# Patient Record
Sex: FEMALE | Race: WHITE | Employment: UNEMPLOYED | URBAN - METROPOLITAN AREA
[De-identification: names, ages, dates, MRNs, and addresses within clinical notes are randomized per-mention and may not be internally consistent; named-entity substitution may affect disease eponyms.]

---

## 2017-01-16 ENCOUNTER — PRE VISIT (OUTPATIENT)
Dept: OTOLARYNGOLOGY | Facility: CLINIC | Age: 37
End: 2017-01-16

## 2017-01-16 NOTE — TELEPHONE ENCOUNTER
ENT PRE VISIT NOTE    On the phone call:    Date of Call: January 16, 2017  Date of appointment: February 16, 2017  Reason for Call (Should match scheduling appointment note): Sleep apnea discuss Inspire   Who made the initial call:  (patient, Parent (Name of Parent), referral source) Patient   Who is the Referring Provider? (Name, address, phone number, location of clinic) Self   Where have you been seen for this condition? [facility]  Where and what testing has been done including: Sleep study: 4/3/16  ENT related surgeries in the past: No       Request medical records: Yes   From where: [facility]  What date: 1/17/17  Who did you speak to: Fax   Is the information already in the EMR? No         Have films been pushed to PAC and when? no.     Have slides been sent to pathology and when?       Have outside records been received? Yes

## 2017-02-16 ENCOUNTER — OFFICE VISIT (OUTPATIENT)
Dept: OTOLARYNGOLOGY | Facility: CLINIC | Age: 37
End: 2017-02-16

## 2017-02-16 VITALS — WEIGHT: 190 LBS | HEIGHT: 72 IN | BODY MASS INDEX: 25.73 KG/M2

## 2017-02-16 DIAGNOSIS — G47.33 OSA (OBSTRUCTIVE SLEEP APNEA): Primary | ICD-10-CM

## 2017-02-16 ASSESSMENT — PAIN SCALES - GENERAL: PAINLEVEL: NO PAIN (0)

## 2017-02-16 NOTE — PATIENT INSTRUCTIONS
Dr. Garcia would like you to consult with one of the three providers listed below to discuss an oral appliance.      *Please note, it is your responsibility to check with your insurance about whether or not these providers are within your Dental network*    Micah Zavala DDS  [facility]  [address] [place] [address]  [phone]      Ashley Apodaca DDS   [facility]  [address] [place] [address] [phone]    Roger Martel DDS  [facility]  [facility]  [phone]    Oral Appliance Therapy  An oral appliance is a small acrylic device that fits over the upper and lower teeth  or tongue (similar to an orthodontic retainer or mouth guard). This device slightly  advances the lower jaw or tongue, moving the base of the tongue forward and  opening the airway. This improves breathing and reduces snoring and apnea.  The appliance is fabricated and customized for each patient by a dentist with  advanced training in the treatment of snoring and sleep apnea. The appliances are  comfortable and well tolerated by the patients. They are easy to place and remove,  easy to clean and are convenient for travel.  The American Academy of Sleep Medicine has stated that an oral appliance is indicated as a first treatment of choice for patients with primary snoring, mild obstructive sleep apnea or patients with moderate YEIMY who  prefer the appliance to CPAP and as a second treatment option for patients with severe sleep apnea who  cannot tolerate CPAP.    Therefore, oral appliance therapy is indicated for:    Primary/heavy snoring    Mild or moderate sleep apnea and certain cases of severe YEIMY    Poor tolerance of nasal CPAP    Failure of surgery    Use during travel    In combination with nasal CPAP    There are a number of temporary side effects that may be noticeable during the first few weeks or may require minor adjustment of the  appliance by the dentist. These include:    Tension in the jaw    Sore teeth or gums    Excessive salivation or a dry mouth    Temporary change in the bite (when appliance is removed in the morning)    Noises in the jaw joint (TMJ)  The potential side effects that can be more problematic include:    Jaw muscle or joint pain    Permanent changes in the bite    Slight movement of teeth    Loosening of dental restorations (crowns, bridges, etc.)      From the research evidence and our clinical experience, jaw muscle and joint pain occur in approximately  10% of the patients and the pain will disappear when the patient discontinues use of the appliance. Soreness  or pain is not an issue for 90% of the patients. Changes in the bite can occur for about 30 - 40% of the  patients. Although the changes may be slight it may still be difficult for the patient to close their back teeth  together, which may have an effect on their ability to chew effectively. The slight movement of teeth and  loosening of dental restorations occurs very infrequently (1% of the patients) but is still worth noting.      Return here as needed, call with questions.     Please call/contact with further questions/concerns.     Sincerely,    MELQUIADES Zepeda R.N.    My days of work are  Monday 12:30-4:30 PM, Tuesday 8-4:30, Thursday 8-4:30. primarily in ENT Triage.    [facility]  ENT clinic [phone]   [address] [place] [address]  Option #1 for appointments  Option # 3 for nurse triage   Fax: [phone]

## 2017-02-16 NOTE — MR AVS SNAPSHOT
After Visit Summary   2/16/2017    Shelli Pretty    MRN: [id number]           Patient Information     Date Of Birth          1980        Visit Information        Provider Department      2/16/2017 10:00 AM Kena Garcia MD [facility]        Care Instructions    Dr. Garcia would like you to consult with one of the three providers listed below to discuss an oral appliance.      *Please note, it is your responsibility to check with your insurance about whether or not these providers are within your Dental network*    Micah Zavala DDS  [facility]  [address] [place] [address]  [phone]      Ashley Apodaca DDS   [facility]  [address] [place] [address] [phone]    Roger Martel DDS  [facility]  [facility]  [phone]    Oral Appliance Therapy  An oral appliance is a small acrylic device that fits over the upper and lower teeth  or tongue (similar to an orthodontic retainer or mouth guard). This device slightly  advances the lower jaw or tongue, moving the base of the tongue forward and  opening the airway. This improves breathing and reduces snoring and apnea.  The appliance is fabricated and customized for each patient by a dentist with  advanced training in the treatment of snoring and sleep apnea. The appliances are  comfortable and well tolerated by the patients. They are easy to place and remove,  easy to clean and are convenient for travel.  The American Academy of Sleep Medicine has stated that an oral appliance is indicated as a first treatment of choice for patients with primary snoring, mild obstructive sleep apnea or patients with moderate YEIMY who  prefer the appliance to CPAP and as a second treatment option for patients with severe sleep apnea who  cannot tolerate CPAP.    Therefore, oral appliance therapy is indicated for:    Primary/heavy snoring     Mild or moderate sleep apnea and certain cases of severe YEIMY    Poor tolerance of nasal CPAP    Failure of surgery    Use during travel    In combination with nasal CPAP    There are a number of temporary side effects that may be noticeable during the first few weeks or may require minor adjustment of the appliance by the dentist. These include:    Tension in the jaw    Sore teeth or gums    Excessive salivation or a dry mouth    Temporary change in the bite (when appliance is removed in the morning)    Noises in the jaw joint (TMJ)  The potential side effects that can be more problematic include:    Jaw muscle or joint pain    Permanent changes in the bite    Slight movement of teeth    Loosening of dental restorations (crowns, bridges, etc.)      From the research evidence and our clinical experience, jaw muscle and joint pain occur in approximately  10% of the patients and the pain will disappear when the patient discontinues use of the appliance. Soreness  or pain is not an issue for 90% of the patients. Changes in the bite can occur for about 30 - 40% of the  patients. Although the changes may be slight it may still be difficult for the patient to close their back teeth  together, which may have an effect on their ability to chew effectively. The slight movement of teeth and  loosening of dental restorations occurs very infrequently (1% of the patients) but is still worth noting.      Return here as needed, call with questions.     Please call/contact with further questions/concerns.     Sincerely,    MELQUIADES Zepeda R.N.    My days of work are  Monday 12:30-4:30 PM, Tuesday 8-4:30, Thursday 8-4:30. primarily in ENT Triage.    [facility]  ENT clinic [phone]   [address] [place] [address]  Option #1 for appointments  Option # 3 for nurse triage   Fax: [phone]        Follow-ups after your visit        Follow-up notes from your care team     Return if symptoms worsen or fail to improve.      Who to  "contact     Please call your clinic at [phone] to:    Ask questions about your health    Make or cancel appointments    Discuss your medicines    Learn about your test results    Speak to your doctor   If you have compliments or concerns about an experience at your clinic, or if you wish to file a complaint, please contact [facility] Patient Relations at [phone] or email us at [email or web address]         Additional Information About Your Visit        MyChart Information     [employer] gives you secure access to your electronic health record. If you see a primary care provider, you can also send messages to your care team and make appointments. If you have questions, please call your primary care clinic.  If you do not have a primary care provider, please call [phone] and they will assist you.      [employer] is an electronic gateway that provides easy, online access to your medical records. With [employer], you can request a clinic appointment, read your test results, renew a prescription or communicate with your care team.     To access your existing account, please contact your [facility] or call [phone] for assistance.        Care EveryWhere ID     This is your Care EveryWhere ID. This could be used by other organizations to access your [place] medical records  [id number]        Your Vitals Were     Height BMI (Body Mass Index)                1.816 m (5' 11.5\") 26.13 kg/m2           Blood Pressure from Last 3 Encounters:   07/08/14 124/77    Weight from Last 3 Encounters:   02/16/17 86.2 kg (190 lb)   07/07/14 87 kg (191 lb 12.8 oz)              Today, you had the following     No orders found for display       Primary Care Provider Office Phone # Fax #    Tim Huizar [phone]       [facility] [address] [facility] [address]  [facility] [address]        Thank you!     Thank you for choosing [facility]  for your care. Our goal is always to provide you with excellent care. Hearing back from our patients is one way we can continue to improve our services. Please take a few minutes to complete the written survey that you may receive in the mail after your visit with us. Thank you!             Your Updated Medication List - Protect others around you: Learn how to safely use, store and throw away your medicines at www.disposemymeds.org.          This list is accurate as of: 2/16/17 10:52 AM.  Always use your most recent med list.                   Brand Name Dispense Instructions for use    diazepam 5 MG tablet    VALIUM    30 tablet    Take 1 tablet (5 mg) by mouth every 8 hours as needed for anxiety or muscle spasms       FLEXERIL PO          oxyCODONE 5 MG IR tablet    ROXICODONE    60 tablet    Take 1-2 tablets (5-10 mg) by mouth every 4 hours as needed for moderate to severe pain       RELPAX PO      Take 20 mg by mouth once as needed for migraine Reported on 2/16/2017

## 2017-02-16 NOTE — PROGRESS NOTES
SLEEP SURGERY CONSULTATION    Patient: Shelli Pretty  : 1980  CHIEF COMPLAINT: YEIMY    IDENTIFICATION: Patient consulted Dr. Garcia for surgical evaluation and possible treatment of obstructive sleep apnea syndrome for Shelli Pretty.    HPI:  Shelli Pretty is a 36 year old year old female who has mild Obstructive Sleep Apnea.  The patient had a home sleep study performed at [facility] on 2016.  A home sleep study showed an overall AHI of 5.1 with an GUNNER of 4.9.  Snore index was only 3%.  He spent time both in the supine and non-supine position, although we do not have a breakdown if the sleep apnea is worse in the supine position or not.  The patient had her sleep evaluated initially because she was having a difficult time breathing during the day.  Essentially she felt like she could not get a deep breath in.  They worked her up for asthma and other pulmonary things, which was all negative.  The last stop was a sleep study, which showed mild sleep apnea.  She was started on CPAP.  She does note that CPAP did help improve her breathing during the day as well as her sleep quality and therefore she has less sleepiness during the day.  However, the mass itself is giving her a lot of skin blistering around her nose and her mouth and because of that, she has not really been able to use the mask for the last four weeks.  She is interested in Inspire therapy.         PAST MEDICAL HISTORY:  Past Medical History   Diagnosis Date     Allergic rhinitis ?     Migraines      Sleep apnea 2016     Uncomplicated asthma      exercise induced       PAST SURGICAL HISTORY:  Past Surgical History   Procedure Laterality Date     Appendectomy       Gyn surgery       endometric ablation     Replace disk cervical anterior  2014     Procedure: REPLACE DISK CERVICAL ANTERIOR;  Surgeon: Moreno Jaramillo MD;  Location:  OR       MEDICATIONS:  Current Outpatient Prescriptions   Medication Sig  "Dispense Refill     Cyclobenzaprine HCl (FLEXERIL PO)        oxyCODONE (ROXICODONE) 5 MG immediate release tablet Take 1-2 tablets (5-10 mg) by mouth every 4 hours as needed for moderate to severe pain (Patient not taking: Reported on 2/16/2017) 60 tablet 0     diazepam (VALIUM) 5 MG tablet Take 1 tablet (5 mg) by mouth every 8 hours as needed for anxiety or muscle spasms (Patient not taking: Reported on 2/16/2017) 30 tablet      Eletriptan Hydrobromide (RELPAX PO) Take 20 mg by mouth once as needed for migraine Reported on 2/16/2017         ALLERGIES:  Allergies   Allergen Reactions     Adhesive Tape Rash     Sulfa Drugs Anxiety     Mouth went numb       SOCIAL HISTORY:  Social History     Social History     Marital status:      Spouse name: N/A     Number of children: N/A     Years of education: N/A     Occupational History     Not on file.     Social History Main Topics     Smoking status: Never Smoker     Smokeless tobacco: Never Used     Alcohol use Yes      Comment: occasional     Drug use: No     Sexual activity: Yes     Partners: Male     Birth control/ protection: Male Surgical, Female Surgical     Other Topics Concern     Not on file     Social History Narrative       FAMILY HISTORY:  Family History   Problem Relation Age of Onset     CANCER Father      oral cancer     CANCER Maternal Grandmother      lung cancer     CANCER Paternal Grandmother      breast cancer     Thyroid Disease Mother        REVIEW OF SYSTEMS:   ENT ROS 2/12/2017   Constitutional Problems with sleep   Allergy/Immunology Allergies or hay fever         PHYSICAL EXAM  Ht 1.816 m (5' 11.5\")  Wt 86.2 kg (190 lb)  BMI 26.13 kg/m2    Constitutional: healthy, alert and no distress  ENT:   MOUTH:   MMM 3, tonsils 1+, good mouth opening    EYES: extraocular movements intact    ASSESSMENT:  1.  Mild obstructive sleep apnea,     PLAN:  The patient has very mild obstructive sleep apnea.  She is not a candidate for upper airway stimulation " therapy due to this.  I asked her what her main goals for treating her sleep were.  She reports that she would like to continue to breathe better during the day, and she does feel like this is related to her sleep at night as well as having more restful sleep.  Therefore, my recommendation for the patient would be to consider an oral appliance.  I made a referral today and provided her with a list of dentists that I work with.  We did briefly discuss snoreplasty procedure, but the snoring does not bother the patient.       I spent 35 minutes face-to-face with Shelli Pretty during today's office visit, of which more than 50% was spent on counseling and coordination of care, which included discussion of pathophysiology of patient's obstructive sleep apnea, treatment options, risks and benefits of each option.

## 2017-02-16 NOTE — NURSING NOTE
Chief Complaint   Patient presents with     Consult     sleep apnea     Rebecca Kaiser Medical Assistant

## 2017-02-16 NOTE — LETTER
2017       RE: Shelli Pretty  [address]  [facility] [place] [facility] [address]     Dear Colleague,    Thank you for referring your patient, Shelli Pretty, to the [facility] at [facility]. Please see a copy of my visit note below.        SLEEP SURGERY CONSULTATION    Patient: Shelli Pretty  : 1980  CHIEF COMPLAINT: YEIMY    IDENTIFICATION: Patient consulted Dr. Garcia for surgical evaluation and possible treatment of obstructive sleep apnea syndrome for Shelli Pretty.    HPI:  Shelli Pretty is a 36 year old year old female who has mild Obstructive Sleep Apnea.  The patient had a home sleep study performed at [facility] on 2016.  A home sleep study showed an overall AHI of 5.1 with an GUNNER of 4.9.  Snore index was only 3%.  He spent time both in the supine and non-supine position, although we do not have a breakdown if the sleep apnea is worse in the supine position or not.  The patient had her sleep evaluated initially because she was having a difficult time breathing during the day.  Essentially she felt like she could not get a deep breath in.  They worked her up for asthma and other pulmonary things, which was all negative.  The last stop was a sleep study, which showed mild sleep apnea.  She was started on CPAP.  She does note that CPAP did help improve her breathing during the day as well as her sleep quality and therefore she has less sleepiness during the day.  However, the mass itself is giving her a lot of skin blistering around her nose and her mouth and because of that, she has not really been able to use the mask for the last four weeks.  She is interested in Inspire therapy.         PAST MEDICAL HISTORY:  Past Medical History   Diagnosis Date     Allergic rhinitis ?     Migraines      Sleep apnea 2016     Uncomplicated asthma      exercise induced       PAST SURGICAL HISTORY:  Past Surgical History   Procedure Laterality Date  "    Appendectomy       Gyn surgery       endometric ablation     Replace disk cervical anterior  7/7/2014     Procedure: REPLACE DISK CERVICAL ANTERIOR;  Surgeon: Moreno Jaramillo MD;  Location:  OR       MEDICATIONS:  Current Outpatient Prescriptions   Medication Sig Dispense Refill     Cyclobenzaprine HCl (FLEXERIL PO)        oxyCODONE (ROXICODONE) 5 MG immediate release tablet Take 1-2 tablets (5-10 mg) by mouth every 4 hours as needed for moderate to severe pain (Patient not taking: Reported on 2/16/2017) 60 tablet 0     diazepam (VALIUM) 5 MG tablet Take 1 tablet (5 mg) by mouth every 8 hours as needed for anxiety or muscle spasms (Patient not taking: Reported on 2/16/2017) 30 tablet      Eletriptan Hydrobromide (RELPAX PO) Take 20 mg by mouth once as needed for migraine Reported on 2/16/2017         ALLERGIES:  Allergies   Allergen Reactions     Adhesive Tape Rash     Sulfa Drugs Anxiety     Mouth went numb       SOCIAL HISTORY:  Social History     Social History     Marital status:      Spouse name: N/A     Number of children: N/A     Years of education: N/A     Occupational History     Not on file.     Social History Main Topics     Smoking status: Never Smoker     Smokeless tobacco: Never Used     Alcohol use Yes      Comment: occasional     Drug use: No     Sexual activity: Yes     Partners: Male     Birth control/ protection: Male Surgical, Female Surgical     Other Topics Concern     Not on file     Social History Narrative       FAMILY HISTORY:  Family History   Problem Relation Age of Onset     CANCER Father      oral cancer     CANCER Maternal Grandmother      lung cancer     CANCER Paternal Grandmother      breast cancer     Thyroid Disease Mother        REVIEW OF SYSTEMS:  [facility] ROS 2/12/2017   Constitutional Problems with sleep   Allergy/Immunology Allergies or hay fever         PHYSICAL EXAM  Ht 1.816 m (5' 11.5\")  Wt 86.2 kg (190 lb)  BMI 26.13 kg/m2    Constitutional: healthy, " alert and no distress  ENT:   MOUTH:   MMM 3, tonsils 1+, good mouth opening    EYES: extraocular movements intact    ASSESSMENT:  1.  Mild obstructive sleep apnea,     PLAN:  The patient has very mild obstructive sleep apnea.  She is not a candidate for upper airway stimulation therapy due to this.  I asked her what her main goals for treating her sleep were.  She reports that she would like to continue to breathe better during the day, and she does feel like this is related to her sleep at night as well as having more restful sleep.  Therefore, my recommendation for the patient would be to consider an oral appliance.  I made a referral today and provided her with a list of dentists that I work with.  We did briefly discuss snoreplasty procedure, but the snoring does not bother the patient.       I spent 35 minutes face-to-face with Shelli Pretty during today's office visit, of which more than 50% was spent on counseling and coordination of care, which included discussion of pathophysiology of patient's obstructive sleep apnea, treatment options, risks and benefits of each option.      Again, thank you for allowing me to participate in the care of your patient.      Sincerely,    Kena Garcia MD

## 2017-12-17 ENCOUNTER — HEALTH MAINTENANCE LETTER (OUTPATIENT)
Age: 37
End: 2017-12-17

## 2020-03-02 ENCOUNTER — HEALTH MAINTENANCE LETTER (OUTPATIENT)
Age: 40
End: 2020-03-02

## 2020-12-20 ENCOUNTER — HEALTH MAINTENANCE LETTER (OUTPATIENT)
Age: 40
End: 2020-12-20

## 2021-04-24 ENCOUNTER — HEALTH MAINTENANCE LETTER (OUTPATIENT)
Age: 41
End: 2021-04-24

## 2021-10-03 ENCOUNTER — HEALTH MAINTENANCE LETTER (OUTPATIENT)
Age: 41
End: 2021-10-03

## 2022-05-15 ENCOUNTER — HEALTH MAINTENANCE LETTER (OUTPATIENT)
Age: 42
End: 2022-05-15

## 2022-09-10 ENCOUNTER — HEALTH MAINTENANCE LETTER (OUTPATIENT)
Age: 42
End: 2022-09-10

## 2023-06-03 ENCOUNTER — HEALTH MAINTENANCE LETTER (OUTPATIENT)
Age: 43
End: 2023-06-03